# Patient Record
Sex: MALE | Race: WHITE
[De-identification: names, ages, dates, MRNs, and addresses within clinical notes are randomized per-mention and may not be internally consistent; named-entity substitution may affect disease eponyms.]

---
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
## 2017-11-15 NOTE — CT
PRELIMINARY REPORT/[facility]/EMERGENCY AFTER

HOURS PROCEDURE:

 

EXAM:

CT Abdomen and Pelvis With Intravenous Contrast

 

EXAM DATE/TIME:

Exam ordered 11/15/2017 2:39 AM

 

CLINICAL HISTORY:

22 years old, male; Pain; Abdominal pain; Epigastric; Patient HX: Pt presents to the er for abdominal
 pain; Pt had oral surgery today. Was home eating post-op and took pain medication. States sudden ons
et of severe abdominal pain bilat upper quad. ; Additional info: Iv contrast only per er md

 

TECHNIQUE:

Axial computed tomography images of the abdomen and pelvis with intravenous contrast. All CT scans at
 this facility use one or more dose reduction techniques, viz.: automated exposure control; ma/kV adj
ustment per patient size (including targeted exams where dose is matched to indication; i.e. head); o
r iterative reconstruction technique.

Coronal reformatted images were created and reviewed.

 

CONTRAST:

100 mL of ISOVUE 370 administered intravenously.

 

COMPARISON:

No relevant prior studies available.

 

FINDINGS:

Lower thorax: No acute findings.

 

ABDOMEN:

Liver: No pneumatosis or portal/mesenteric venous gas.

Gallbladder and bile ducts: Unremarkable. No calcified stones. No ductal dilation.

Pancreas: Unremarkable. No mass. No ductal dilation.

Spleen: Unremarkable. No splenomegaly.

Adrenals: Unremarkable. No mass.

Kidneys and ureters: Unremarkable. No solid mass. No hydronephrosis.

Stomach and bowel: High-grade mid small bowel obstruction with transition point in the central abdome
n, presumably secondary to adhesion. No bowel wall thickening.

Appendix: Normal appendix.

 

PELVIS:

Bladder: Unremarkable. No mass.

Reproductive: Unremarkable as visualized.

 

ABDOMEN and PELVIS:

Intraperitoneal space: No pneumoperitoneum or abscess. No significant fluid collection.

Bones/joints: No acute fracture. No dislocation.

Soft tissues: Unremarkable.

Vasculature: SMV and SMA are patent as far as can be traced.

Lymph nodes: Unremarkable. No enlarged lymph nodes.

 

IMPRESSION:

High-grade mid small bowel obstruction with transition point in the central abdomen, presumably secon
lauro to adhesion.

 

Thank you for allowing us to participate in the care of your patient.

 

Dictated and Authenticated by: Finesse Alfaro MD

11/15/2017 3:05 AM Central Time ([place] & [place])

 

 

 

 

 

 

                               FINAL REPORT 

 

CT ABDOMEN AND PELVIS WITH CONTRAST:

 

DATE: 11/15/17.

 

FINDINGS: 

Spiral CT of the abdomen and pelvis was performed for evaluation of severe abdominal pain. Axial slic
es were acquired after giving IV contrast.  Oral contrast was withheld by request.  Coronal reconstru
ctions were done.

 

The main finding on the study was a stomach full of fluid and dilated loops of small bowel to the mid
 small bowel level.  Some loops are as wide as 4 cm.  There is a transition point in the mid abdomen,
 after which small bowel and large bowel are normal in size.  The findings are consistent with a mid 
small bowel obstruction.  No dramatic thickening of the bowel loops was seen. There is no free air or
 free fluid.

 

The lung bases are clear.  The spleen is a little generous in size measuring just over 15 cm in lengt
h.  The liver appears normal as does the pancreas, adrenal glands, kidneys, and aorta.  Both the robby
ac, SMA, and JEANNETTE fill with contrast.  

 

CT of the pelvis shows no pelvic masses, inflammatory changes, or fluid collections.

 

IMPRESSION: 

Findings consistent with a high-grade mid small bowel obstruction.  Etiology not apparent.

 

Report in agreement with preliminary reading by [facility].

 

POS: HOME

## 2018-07-22 ENCOUNTER — HOSPITAL ENCOUNTER (EMERGENCY)
Dept: HOSPITAL 57 - BURERS | Age: 23
Discharge: HOME | End: 2018-07-22
Payer: COMMERCIAL

## 2018-07-22 DIAGNOSIS — F17.210: ICD-10-CM

## 2018-07-22 DIAGNOSIS — I10: ICD-10-CM

## 2018-07-22 DIAGNOSIS — K04.7: Primary | ICD-10-CM

## 2018-07-22 DIAGNOSIS — Z71.6: ICD-10-CM

## 2018-07-22 PROCEDURE — 99406 BEHAV CHNG SMOKING 3-10 MIN: CPT

## 2018-08-13 ENCOUNTER — HOSPITAL ENCOUNTER (EMERGENCY)
Dept: HOSPITAL 57 - BURERS | Age: 23
Discharge: HOME | End: 2018-08-13
Payer: COMMERCIAL

## 2018-08-13 DIAGNOSIS — F17.290: ICD-10-CM

## 2018-08-13 DIAGNOSIS — L23.7: Primary | ICD-10-CM

## 2018-08-13 DIAGNOSIS — I10: ICD-10-CM

## 2018-08-13 DIAGNOSIS — F17.210: ICD-10-CM

## 2018-08-13 PROCEDURE — 99282 EMERGENCY DEPT VISIT SF MDM: CPT

## 2020-12-17 ENCOUNTER — HOSPITAL ENCOUNTER (EMERGENCY)
Dept: HOSPITAL 57 - BURERS | Age: 25
Discharge: HOME | End: 2020-12-17
Payer: COMMERCIAL

## 2020-12-17 DIAGNOSIS — F17.210: ICD-10-CM

## 2020-12-17 DIAGNOSIS — I10: ICD-10-CM

## 2020-12-17 DIAGNOSIS — J06.9: Primary | ICD-10-CM

## 2020-12-17 PROCEDURE — 99283 EMERGENCY DEPT VISIT LOW MDM: CPT

## 2021-04-13 ENCOUNTER — HOSPITAL ENCOUNTER (EMERGENCY)
Dept: HOSPITAL 57 - BURERS | Age: 26
Discharge: HOME | End: 2021-04-13
Payer: COMMERCIAL

## 2021-04-13 DIAGNOSIS — F17.210: ICD-10-CM

## 2021-04-13 DIAGNOSIS — S93.601A: Primary | ICD-10-CM

## 2021-04-13 DIAGNOSIS — I10: ICD-10-CM
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
